# Patient Record
Sex: FEMALE | Race: ASIAN
[De-identification: names, ages, dates, MRNs, and addresses within clinical notes are randomized per-mention and may not be internally consistent; named-entity substitution may affect disease eponyms.]

---

## 2019-08-18 ENCOUNTER — HOSPITAL ENCOUNTER (OUTPATIENT)
Dept: HOSPITAL 5 - TRG | Age: 29
Discharge: HOME | End: 2019-08-18
Attending: OBSTETRICS & GYNECOLOGY
Payer: COMMERCIAL

## 2019-08-18 DIAGNOSIS — Z3A.24: ICD-10-CM

## 2019-08-18 DIAGNOSIS — O36.8120: Primary | ICD-10-CM

## 2019-08-18 DIAGNOSIS — O47.02: ICD-10-CM

## 2019-08-18 PROCEDURE — 76816 OB US FOLLOW-UP PER FETUS: CPT

## 2019-08-18 NOTE — ULTRASOUND REPORT
US OB follow up



INDICATION / CLINICAL INFORMATION:

Decreased FM.



COMPARISON:

None available.



FINDINGS:

Single, viable intrauterine pregnancy, currently in transverse presentation, with the head to the mat
ernal right. Fetal heart rate 152.



Amniotic fluid volume is normal, with a fluid index of 19 cm.



Biparietal diameter 6.7 cm, 26 weeks 6 days.

History conference 24.6 cm, 26 weeks 5 days.

Abdominal circumference 22.1 cm, 26 weeks 4 days.

Femur length 5.0 cm, 26 weeks 6 days.



Estimated fetal body weight 973 g.



IMPRESSION:

1. Viable intrauterine pregnancy, ultrasound gestational age 26 weeks 5 days. Estimated fetal body we
ight 973 g. 



Signer Name: Gene Sin MD 

Signed: 8/18/2019 11:55 PM

 Workstation Name: [id number]

## 2019-09-30 ENCOUNTER — HOSPITAL ENCOUNTER (OUTPATIENT)
Dept: HOSPITAL 5 - TRG | Age: 29
Discharge: HOME | End: 2019-09-30
Attending: OBSTETRICS & GYNECOLOGY
Payer: COMMERCIAL

## 2019-09-30 VITALS — SYSTOLIC BLOOD PRESSURE: 132 MMHG | DIASTOLIC BLOOD PRESSURE: 79 MMHG

## 2019-09-30 DIAGNOSIS — Z3A.31: ICD-10-CM

## 2019-09-30 DIAGNOSIS — R10.2: ICD-10-CM

## 2019-09-30 DIAGNOSIS — O26.893: Primary | ICD-10-CM

## 2019-09-30 PROCEDURE — 59025 FETAL NON-STRESS TEST: CPT

## 2019-11-13 ENCOUNTER — HOSPITAL ENCOUNTER (OUTPATIENT)
Dept: HOSPITAL 5 - TRG | Age: 29
Discharge: HOME | End: 2019-11-13
Attending: OBSTETRICS & GYNECOLOGY
Payer: COMMERCIAL

## 2019-11-13 VITALS — DIASTOLIC BLOOD PRESSURE: 83 MMHG | SYSTOLIC BLOOD PRESSURE: 127 MMHG

## 2019-11-13 DIAGNOSIS — O47.1: Primary | ICD-10-CM

## 2019-11-13 DIAGNOSIS — Z3A.37: ICD-10-CM

## 2019-11-13 LAB
BACTERIA #/AREA URNS HPF: (no result) /HPF
BILIRUB UR QL STRIP: (no result)
BLOOD UR QL VISUAL: (no result)
MUCOUS THREADS #/AREA URNS HPF: (no result) /HPF
PH UR STRIP: 6 [PH] (ref 5–7)
PROT UR STRIP-MCNC: (no result) MG/DL
RBC #/AREA URNS HPF: 4 /HPF (ref 0–6)
UROBILINOGEN UR-MCNC: < 2 MG/DL (ref ?–2)
WBC #/AREA URNS HPF: 4 /HPF (ref 0–6)

## 2019-11-13 PROCEDURE — 81001 URINALYSIS AUTO W/SCOPE: CPT

## 2019-11-13 PROCEDURE — 59025 FETAL NON-STRESS TEST: CPT

## 2019-11-24 ENCOUNTER — HOSPITAL ENCOUNTER (OUTPATIENT)
Dept: HOSPITAL 5 - TRG | Age: 29
Discharge: HOME | End: 2019-11-24
Attending: OBSTETRICS & GYNECOLOGY
Payer: COMMERCIAL

## 2019-11-24 DIAGNOSIS — Z3A.39: ICD-10-CM

## 2019-11-24 DIAGNOSIS — O47.1: Primary | ICD-10-CM

## 2019-11-25 ENCOUNTER — HOSPITAL ENCOUNTER (OUTPATIENT)
Dept: HOSPITAL 5 - TRG | Age: 29
Discharge: HOME | End: 2019-11-25
Attending: OBSTETRICS & GYNECOLOGY
Payer: COMMERCIAL

## 2019-11-25 VITALS — SYSTOLIC BLOOD PRESSURE: 114 MMHG | DIASTOLIC BLOOD PRESSURE: 82 MMHG

## 2019-11-25 DIAGNOSIS — Z3A.39: ICD-10-CM

## 2019-11-25 DIAGNOSIS — O47.1: Primary | ICD-10-CM

## 2019-11-25 PROCEDURE — 59025 FETAL NON-STRESS TEST: CPT

## 2019-12-01 ENCOUNTER — HOSPITAL ENCOUNTER (OUTPATIENT)
Dept: HOSPITAL 5 - TRG | Age: 29
Discharge: HOME | End: 2019-12-01
Attending: OBSTETRICS & GYNECOLOGY
Payer: COMMERCIAL

## 2019-12-01 VITALS — DIASTOLIC BLOOD PRESSURE: 62 MMHG | SYSTOLIC BLOOD PRESSURE: 106 MMHG

## 2019-12-01 DIAGNOSIS — Z3A.40: ICD-10-CM

## 2019-12-07 ENCOUNTER — HOSPITAL ENCOUNTER (INPATIENT)
Dept: HOSPITAL 5 - TRG | Age: 29
LOS: 2 days | Discharge: HOME | End: 2019-12-09
Attending: OBSTETRICS & GYNECOLOGY | Admitting: OBSTETRICS & GYNECOLOGY
Payer: COMMERCIAL

## 2019-12-07 DIAGNOSIS — Z23: ICD-10-CM

## 2019-12-07 DIAGNOSIS — Z3A.41: ICD-10-CM

## 2019-12-07 DIAGNOSIS — Z67.41: ICD-10-CM

## 2019-12-07 DIAGNOSIS — O26.893: ICD-10-CM

## 2019-12-07 DIAGNOSIS — O48.0: Primary | ICD-10-CM

## 2019-12-07 DIAGNOSIS — E66.9: ICD-10-CM

## 2019-12-07 LAB
HCT VFR BLD CALC: 37.6 % (ref 30.3–42.9)
HGB BLD-MCNC: 12.3 GM/DL (ref 10.1–14.3)
MCHC RBC AUTO-ENTMCNC: 33 % (ref 30–34)
MCV RBC AUTO: 91 FL (ref 79–97)
PLATELET # BLD: 215 K/MM3 (ref 140–440)
RBC # BLD AUTO: 4.14 M/MM3 (ref 3.65–5.03)

## 2019-12-07 PROCEDURE — 36415 COLL VENOUS BLD VENIPUNCTURE: CPT

## 2019-12-07 PROCEDURE — 86900 BLOOD TYPING SEROLOGIC ABO: CPT

## 2019-12-07 PROCEDURE — 85461 HEMOGLOBIN FETAL: CPT

## 2019-12-07 PROCEDURE — 86850 RBC ANTIBODY SCREEN: CPT

## 2019-12-07 PROCEDURE — 85018 HEMOGLOBIN: CPT

## 2019-12-07 PROCEDURE — 90686 IIV4 VACC NO PRSV 0.5 ML IM: CPT

## 2019-12-07 PROCEDURE — 3E0R3BZ INTRODUCTION OF ANESTHETIC AGENT INTO SPINAL CANAL, PERCUTANEOUS APPROACH: ICD-10-PCS

## 2019-12-07 PROCEDURE — 85027 COMPLETE CBC AUTOMATED: CPT

## 2019-12-07 PROCEDURE — 00HU33Z INSERTION OF INFUSION DEVICE INTO SPINAL CANAL, PERCUTANEOUS APPROACH: ICD-10-PCS

## 2019-12-07 PROCEDURE — 86901 BLOOD TYPING SEROLOGIC RH(D): CPT

## 2019-12-07 PROCEDURE — 85014 HEMATOCRIT: CPT

## 2019-12-07 PROCEDURE — 86592 SYPHILIS TEST NON-TREP QUAL: CPT

## 2019-12-07 NOTE — ANESTHESIA CONSULTATION
Anesthesia Consult and Med Hx


Date of service: 12/07/19





- Airway


Anesthetic Teeth Evaluation: Good


ROM Head & Neck: Adequate


Mental/Hyoid Distance: Adequate


Mallampati Class: Class II


Intubation Access Assessment: Probably Good





- Pulmonary Exam


CTA: Yes





- Cardiac Exam


Cardiac Exam: RRR





- Pre-Operative Health Status


ASA Pre-Surgery Classification: ASA2


Proposed Anesthetic Plan: Epidural





- Pulmonary


Hx Smoking: Yes


Hx Asthma: No


Hx Respiratory Symptoms: No


SOB: No


COPD: No


Home Oxygen Therapy: No


Hx Pneumonia: No


Hx Sleep Apnea: No





- Cardiovascular System


Hx Hypertension: No


Hx Coronary Artery Disease: No


Hx Heart Attack/AMI: No


Hx Angina: No


Hx Percutaneous Transluminal Coronary Angioplasty (PTCA): No


Hx Cardia Arrhythmia: No


Hx Pacemaker: No


Hx Internal Defibrillator: No


Hx Valvular Heart Disease: No


Hx Heart Murmur: No


Hx Peripheral Vascular Disease: No





- Central Nervous System


Hx Neuromuscular Disorder: No


Hx Seizures: No


CVA: No


Hx Back Pain: No


Hx Psychiatric Problems: No





- Gastrointestinal


Hx Ulcer: No


Hx Gastroesophageal Reflux Disease: No





- Endocrine


Hx Renal Disease: No


Hx End Stage Renal Disease: No


Hx Cirrhosis: No


Hx Liver Disease: No


Hx Insulin Dependent Diabetes: No


Hx Non-Insulin Dependent Diabetes: No


Hx Thyroid Disease: No


Hx Hypothyroidism: No


Hx Hyperthyroidism: No





- Hematic


Hx Anemia: No


Hx Sickle Cell Disease: No





- Other Systems


Hx Alcohol Use: No


Hx Substance Use: No


Hx Cancer: No


Hx Obesity: Yes (36.6)

## 2019-12-07 NOTE — HISTORY AND PHYSICAL REPORT
History of Present Illness


Date of examination: 19


Date of admission: 


19 13:43





Chief complaint: 





Labor


History of present illness: 


Past Pregnancy History 


   :      4


   Term Births:      2


   Premature Births:   0


   Living Children:   2


   Para:      2


   Mult. Births:      0


   Prev :   0


   Prev.  attempt?   none


   Aborta:      1


   Elect. Ab:      0


   Spont. Ab:      1


   Ectopics:      0





Pregnancy # 1


   Delivery date:     2008


   Weeks Gestation:   39


    labor:     no


   Delivery type:     


   Hours of labor:     6


   Anesthesia type:     epidural


   Infant Sex:      Male


   Birth weight:      7-12


   Name:      Roc





Pregnancy # 2


   Delivery date:     2015


   Weeks Gestation:   41


   Delivery type:     Vaginal


   Anesthesia type:     epidural


   Delivery location:     [facility]


   Infant Sex:      female


   Birth weight:      7.19


   Name:      Twila


   Comments:      none





Pregnancy # 3


   Delivery date:     2015


   Weeks Gestation:   ?


   Delivery type:     SAB


   Comments:      D&C








Past Surgical History:


   Right arm fracture as child





Past Medical History 


Surgery (Non-gyn): Right arm fracture as child


Abnormal PAP: negative


GERRI Exposure: negative


Infertility: negative


Uterine Anomaly: negative


Uterine Surgery (not C/S): negative


Other Gynecologic Problems: negative





Family Hx: denies





Social Hx: [place] [address]


Patient is single


active with one partner


denies ETOH/alcohol/tobacco





Infection History 


Hx of STD: none


HIV Risk Eval: low risk


Hepatitis B Risk Eval: low risk


Personal hx. of genital herpes: no


Partner hx. of genital herpes: no


Rash, Viral, or Febrile illness since last LMP? no


Varicella/Chicken Pox Status: Immunized


TB Risk: no





Genetic History 


 Congenital Heart Defect:


    Mom: no  Dad: no


Canavan Disease:


    Mom: no  Dad: no


Thalassemia


    Mom: no  Dad: no


Neural Tube Defect


    Mom: no  Dad: no


Down's Syndrome


    Mom: no  Dad: no


Matt-Sachs


    Mom: no  Dad: no


Sickle Cell Disease/Trait


    Mom: no  Dad: yes


Hemophilia


    Mom: no  Dad: no


Muscular Dystrophy


    Mom: no  Dad: no


Cystic Fibrosis


    Mom: no  Dad: no


[place] Chorea


    Mom: no  Dad: no


Mental Retardation


    Mom: no  Dad: no


Fragile X


    Mom: no  Dad: no


Other Genetic/Chromosomal Disorder


    Mom: no  Dad: no


Child w/other birth defect


    Mom: no  Dad: no





Enviromental Exposures 


 Enviromental Exposures Reviewed


Xray Exposure: no


Medication, drug, or alcohol use since LMP: no


Chemical/Other Exposure: no


Exposure to Cat Liter: no


Hx of Parvovirus (Fifth Disease): no


Occupational Exposure to Children: none


Active Medications (reviewed today):


FISH OIL () 


MIRENA (52 MG) 20 MCG/24HR INTRAUTERINE INTRAUTERINE DEVICE (LEVONORGESTREL) 





Current Allergies (reviewed today):


No known allergies








Past History





- Obstetrical History


Expected Date of Delivery: 19


Actual Gestation: 41 Week(s) 1 Day(s) 


: 4





Medications and Allergies


                                    Allergies











Allergy/AdvReac Type Severity Reaction Status Date / Time


 


No Known Allergies Allergy   Verified 19 18:27











                                Home Medications











 Medication  Instructions  Recorded  Confirmed  Last Taken  Type


 


Pnv with Ca,No.72/Iron/FA 1 tab PO DAILY 03/08/15 11/25/19 11/23/19 History





[Prenatal Plus Tablet]     











Active Meds: 


Active Medications





Ephedrine Sulfate (Ephedrine Sulfate)  10 mg IV Q2M PRN


   PRN Reason: Hypotension


Ephedrine Sulfate (Ephedrine Sulfate)  10 mg IV Q2M PRN


   PRN Reason: Hypotension


Oxytocin/Sodium Chloride (Pitocin/Ns 20 Unit/1000ml Drip)  20 units in 1,000 mls

@ 125 mls/hr IV AS DIRECT NOLAN


Lactated Ringer's (Lactated Ringers)  1,000 mls @ 125 mls/hr IV AS DIRECT NOLAN


Fentanyl/Bupivacaine/Sodium Chlor (Fentanyl-Bupiv 2 Mcg/Ml-0.125%)  200 mcg in 

100 mls @ 12 mls/hr EPIDURAL TITR NOLAN; Protocol


Mineral Oil (Mineral Oil)  30 ml PO QHS PRN


   PRN Reason: Constipation


Naloxone HCl (Naloxone)  0.2 mg IV Q5M PRN


   PRN Reason: Respiratory sedation


Terbutaline Sulfate (Brethine)  0.25 mg SUB-Q ONCE PRN


   PRN Reason: Hyperstimulation/Hypertonicity


Terbutaline Sulfate (Brethine)  0.25 mg IVP ONCE PRN


   PRN Reason: Hyperstimulation/Hypertonicity











Review of Systems


All systems: negative


Genitourinary: contractions





- Vital Signs


Vital signs: 


                                   Vital Signs











Temp


 


 98.4 F 


 


 19 12:35








                                        











Temp Pulse Resp BP Pulse Ox


 


 98.4 F   87      108/59   98 


 


 19 12:35  19 15:32     19 15:31  19 15:32














- Physical Exam


Breasts: Positive: deferred


Lungs: Positive: Normal air movement





- Obstetrical


FHR: category 1


Uterine Contraction Monitor Mode: External


Cervical Dilatation: 7


Cervical Effacement Percentage: 90


Fetal station: -1 per RN


Uterine Contraction Pattern: Irregular





Results


Result Diagrams: 


                                 19 13:00





                              Abnormal lab results











  19 Range/Units





  13:00 


 


WBC  18.4 H  (4.5-11.0)  K/mm3








All other labs normal.








Assessment and Plan





Anticipate vaginal delivery





- Patient Problems


(1) 41 weeks gestation of pregnancy


Current Visit: No   Status: Acute   





(2) Post-dates pregnancy


Current Visit: No   Status: Acute

## 2019-12-08 LAB
HCT VFR BLD CALC: 33.8 % (ref 30.3–42.9)
HGB BLD-MCNC: 11 GM/DL (ref 10.1–14.3)

## 2019-12-08 PROCEDURE — 3E0234Z INTRODUCTION OF SERUM, TOXOID AND VACCINE INTO MUSCLE, PERCUTANEOUS APPROACH: ICD-10-PCS | Performed by: OBSTETRICS & GYNECOLOGY

## 2019-12-08 RX ADMIN — IBUPROFEN SCH MG: 800 TABLET, FILM COATED ORAL at 17:27

## 2019-12-08 RX ADMIN — ACETAMINOPHEN SCH MG: 325 TABLET ORAL at 20:51

## 2019-12-08 RX ADMIN — IBUPROFEN SCH MG: 800 TABLET, FILM COATED ORAL at 23:09

## 2019-12-08 RX ADMIN — IBUPROFEN SCH MG: 600 TABLET, FILM COATED ORAL at 12:58

## 2019-12-08 RX ADMIN — IBUPROFEN SCH MG: 600 TABLET, FILM COATED ORAL at 06:02

## 2019-12-08 RX ADMIN — IBUPROFEN SCH MG: 600 TABLET, FILM COATED ORAL at 01:19

## 2019-12-08 RX ADMIN — ACETAMINOPHEN SCH MG: 325 TABLET ORAL at 15:08

## 2019-12-08 NOTE — PROGRESS NOTE
Assessment and Plan





- Patient Problems


(1) Delivery normal


Current Visit: Yes   Status: Acute   


Plan to address problem: 


Will increase ibuprofen to 800mg and schdule both tylenol and ibuprofen for now.

Patient aware and agrees with POC


Will possibel d/c home in am








(2) Single live birth


Current Visit: Yes   Status: Acute   





(3) 41 weeks gestation of pregnancy


Current Visit: No   Status: Resolved   





(4) Post-dates pregnancy


Current Visit: No   Status: Resolved   





Subjective





- Subjective


Date of service: 19


Principal diagnosis: PPD#1 s/p 


Interval history: 


Past Pregnancy History 


   :      4


   Term Births:      2


   Premature Births:   0


   Living Children:   2


   Para:      2


   Mult. Births:      0


   Prev :   0


   Prev.  attempt?   none


   Aborta:      1


   Elect. Ab:      0


   Spont. Ab:      1


   Ectopics:      0





Pregnancy # 1


   Delivery date:     2008


   Weeks Gestation:   39


    labor:     no


   Delivery type:     


   Hours of labor:     6


   Anesthesia type:     epidural


   Infant Sex:      Male


   Birth weight:      7-12


   Name:      Roc





Pregnancy # 2


   Delivery date:     2015


   Weeks Gestation:   41


   Delivery type:     Vaginal


   Anesthesia type:     epidural


   Delivery location:     [facility]


   Infant Sex:      female


   Birth weight:      7.19


   Name:      Twila


   Comments:      none





Pregnancy # 3


   Delivery date:     2015


   Weeks Gestation:   ?


   Delivery type:     SAB


   Comments:      D&C








Past Surgical History:


   Right arm fracture as child





Past Medical History 


Surgery (Non-gyn): Right arm fracture as child


Abnormal PAP: negative


GERRI Exposure: negative


Infertility: negative


Uterine Anomaly: negative


Uterine Surgery (not C/S): negative


Other Gynecologic Problems: negative





Family Hx: denies





Social Hx: [place] [employer]


Patient is single


active with one partner


denies ETOH/alcohol/tobacco





Infection History 


Hx of STD: none


HIV Risk Eval: low risk


Hepatitis B Risk Eval: low risk


Personal hx. of genital herpes: no


Partner hx. of genital herpes: no


Rash, Viral, or Febrile illness since last LMP? no


Varicella/Chicken Pox Status: Immunized


TB Risk: no





Genetic History 


 Congenital Heart Defect:


    Mom: no  Dad: no


Canavan Disease:


    Mom: no  Dad: no


Thalassemia


    Mom: no  Dad: no


Neural Tube Defect


    Mom: no  Dad: no


Down's Syndrome


    Mom: no  Dad: no


Matt-Sachs


    Mom: no  Dad: no


Sickle Cell Disease/Trait


    Mom: no  Dad: yes


Hemophilia


    Mom: no  Dad: no


Muscular Dystrophy


    Mom: no  Dad: no


Cystic Fibrosis


    Mom: no  Dad: no


[place] Chorea


    Mom: no  Dad: no


Mental Retardation


    Mom: no  Dad: no


Fragile X


    Mom: no  Dad: no


Other Genetic/Chromosomal Disorder


    Mom: no  Dad: no


Child w/other birth defect


    Mom: no  Dad: no





Enviromental Exposures 


 Enviromental Exposures Reviewed


Xray Exposure: no


Medication, drug, or alcohol use since LMP: no


Chemical/Other Exposure: no


Exposure to Cat Liter: no


Hx of Parvovirus (Fifth Disease): no


Occupational Exposure to Children: none


Active Medications (reviewed today):


FISH OIL () 


MIRENA (52 MG) 20 MCG/24HR INTRAUTERINE INTRAUTERINE DEVICE (LEVONORGESTREL) 





Current Allergies (reviewed today):


No known allergies





Patient reports: appetite normal, voiding normally, ambulating normally, other 

(complains of cramps)





Objective





- Vital Signs


Latest vital signs: 


                                   Vital Signs











  Temp Pulse Resp BP BP Pulse Ox


 


 19 12:58    20   


 


 19 12:20  98.4 F  72  18  108/75   98


 


 19 08:40    20   


 


 19 08:27  98.0 F  69  18  125/73   99


 


 19 06:11  98.2 F  68  18  109/76   98


 


 19 01:07  98.2 F  72  20  111/65   98


 


 19 20:04   83   100/65  


 


 19 20:00  98.1 F  88  18   103/55  98


 


 19 19:54   83   90/62  


 


 19 19:47   88   103/55  


 


 19 18:54   100 H     100


 


 19 18:39   111 H     98


 


 19 18:37   103 H     91


 


 19 18:34   127 H     93


 


 19 18:32   94 H     94


 


 19 18:29   104 H     99


 


 19 18:24   96 H     97


 


 19 18:21   120 H     90


 


 19 18:19   101 H     99


 


 19 18:14   97 H     99


 


 19 18:09   88     100


 


 19 18:08   93 H     78 L


 


 19 18:04   87     99


 


 19 17:59   90     99


 


 19 17:54   95 H     99


 


 19 17:49   91 H     100


 


 19 17:47   95 H   127/58  


 


 19 17:44   98 H     99


 


 19 17:39   94 H     100


 


 19 17:34   91 H     98


 


 19 17:33   93 H   133/90  


 


 19 17:29   91 H     100


 


 19 17:25   91 H     83 L


 


 19 17:24   90     99


 


 19 17:19   85     97


 


 19 17:17   76   97/54  


 


 19 17:14   89     98


 


 19 17:09   93 H     99


 


 19 17:04   88     98


 


 19 17:02   81   94/51  


 


 19 16:59  98.1 F  84     99


 


 19 16:54   83     85


 


 19 16:49   89     100


 


 19 16:44   89     98


 


 19 16:33   83   108/56  


 


 19 16:17   86   119/59  


 


 19 16:14   94 H     94


 


 19 16:12   87     96


 


 19 16:07   91 H     96


 


 19 16:02   91 H   118/62   95


 


 19 15:57   95 H     96


 


 19 15:52   95 H     96


 


 19 15:47   102 H     97


 


 19 15:42   90     97


 


 19 15:37   87     96


 


 19 15:32   87     98


 


 19 15:31   87   108/59  


 


 19 15:29   81   110/59  


 


 19 15:27   89   107/59   98


 


 19 15:25   86   108/57  


 


 19 15:23   75   107/59  


 


 19 15:22   83     97


 


 19 15:21   83   107/55  


 


 19 15:20   91 H   138/59  


 


 19 15:17   80   106/51   97


 


 19 15:15   80   105/57  


 


 19 15:13   81   110/58  


 


 19 15:11   80   101/55  


 


 19 15:09   89   122/57  


 


 19 15:08   81   123/60  


 


 19 15:04   96 H   174/73  


 


 19 15:01   92 H   129/81  


 


 19 14:59   84   124/68  


 


 19 14:57   102 H   135/70  


 


 19 14:55   85   123/63  


 


 19 14:54   111 H     98


 


 19 14:53   85   130/73  








                                Intake and Output











 19





 22:59 06:59 14:59


 


Intake Total  600 


 


Output Total  1600 


 


Balance  -1000 


 


Intake:   


 


  Oral  240 


 


  Intake, Free Water  360 


 


Output:   


 


  Urine  1600 


 


    Void  1600 


 


Other:   


 


  Total, Intake Amount  240 


 


  Total, Output Amount  800 


 


  # Voids   


 


    Void  2 


 


  Estimated Blood Loss 300  














- Exam


Breasts: Present: normal.  Absent: engorged


Lungs: Present: Normal air movement


Abdomen: Present: normal appearance, soft


Uterus: Present: fundal height below umbilicus.  Absent: tenderness


Extremities: Present: normal.  Absent: tenderness, edema

## 2019-12-09 VITALS — SYSTOLIC BLOOD PRESSURE: 118 MMHG | DIASTOLIC BLOOD PRESSURE: 75 MMHG

## 2019-12-09 PROCEDURE — 3E0234Z INTRODUCTION OF SERUM, TOXOID AND VACCINE INTO MUSCLE, PERCUTANEOUS APPROACH: ICD-10-PCS | Performed by: OBSTETRICS & GYNECOLOGY

## 2019-12-09 RX ADMIN — IBUPROFEN SCH MG: 800 TABLET, FILM COATED ORAL at 15:19

## 2019-12-09 RX ADMIN — ACETAMINOPHEN SCH MG: 325 TABLET ORAL at 02:56

## 2019-12-09 RX ADMIN — IBUPROFEN SCH MG: 800 TABLET, FILM COATED ORAL at 05:05

## 2019-12-09 NOTE — DISCHARGE SUMMARY
Providers





- Providers


Date of Admission: 


19 13:43





Date of discharge: 19 (Pt desires to be discharged home)


Attending physician: 


CHARO JEFF





                                        





19 23:38


Consult to Lactation Consultant [CONS] Routine 


   Reason For Exam: assistance with breastfeeding, SNS











Primary care physician: 


PO LONGO








Hospitalization


Reason for admission: active labor


Delivery: 


Episiotomy: none


Laceration: none


Other postpartum procedures: none


Postpartum complications: none


Discharge diagnosis: IUP at term delivered


Geff baby: male


Hospital course: 


S: States doing well, and would like to go home, states ambulating, eating, 

voiding without difficulty, pain controlled


O:  FF@U, minimal to moderate lochia, VSS


A: 29 y.o. s/p , desires to go home, uncomplicated postpartum course


P: D/C home today with instructions


    EMLA cream prescribed, instructed not use at home and bring to circumcision 

appointment. 


    Follow up in clinic in 1 week for circumcision, and 4 weeks postpartum





Condition at discharge: Good


Disposition: DC-01 TO HOME OR SELFCARE





- Discharge Diagnoses


(1) Single live birth


Status: Acute   





Plan





- Discharge Medications


Prescriptions: 


Lidocain2.5%/Prilocai2.5% [Emla] 5 gm TP ONCE #1 tube





- Provider Discharge Summary


Activity: routine, no sex for 6 weeks, no heavy lifting 4 weeks, no strenuous 

exercise


Diet: routine


Instructions: routine


Additional instructions: 


[]  Smoking cessation referral if applicable(refer to patient education folder 

for contact #)


[]  Refer to [facility]'s [facility] Booklet








Call your doctor immediately for:


* Fever > 100.5


* Heavy vaginal bleeding ( >1 pad per hour)


* Severe persistent headache


* Shortness of breath


* Reddened, hot, painful area to leg or breast


* Drainage or odor from incision.





* Keep incision clean and dry at all times and follow doctor's instructions 

regarding bathing/showering











- Follow up plan


Follow up: 


PO LONGO MD [Primary Care Provider] - 7 Days


(Congratulations!


Please call to schedule your appointment for circumcision in 1 week.


Bring the EMLA cream with you to circumcision visit.  Do not use at home.


Schedule your postpartum visit in 4 weeks.


[address] [facility]


[address] [place] [id number]


)